# Patient Record
Sex: FEMALE
[De-identification: names, ages, dates, MRNs, and addresses within clinical notes are randomized per-mention and may not be internally consistent; named-entity substitution may affect disease eponyms.]

---

## 2022-05-06 ENCOUNTER — DASHBOARD ENCOUNTERS (OUTPATIENT)
Age: 47
End: 2022-05-06

## 2022-05-06 ENCOUNTER — OFFICE VISIT (OUTPATIENT)
Dept: URBAN - METROPOLITAN AREA CLINIC 80 | Facility: CLINIC | Age: 47
End: 2022-05-06
Payer: COMMERCIAL

## 2022-05-06 ENCOUNTER — WEB ENCOUNTER (OUTPATIENT)
Dept: URBAN - METROPOLITAN AREA CLINIC 80 | Facility: CLINIC | Age: 47
End: 2022-05-06

## 2022-05-06 DIAGNOSIS — D64.89 OTHER SPECIFIED ANEMIAS: ICD-10-CM

## 2022-05-06 DIAGNOSIS — R10.12 LEFT UPPER QUADRANT ABDOMINAL PAIN: ICD-10-CM

## 2022-05-06 DIAGNOSIS — K80.20 CALCULUS OF GALLBLADDER WITHOUT CHOLECYSTITIS WITHOUT OBSTRUCTION: ICD-10-CM

## 2022-05-06 DIAGNOSIS — K76.0 FATTY LIVER: ICD-10-CM

## 2022-05-06 PROBLEM — 70342003: Status: ACTIVE | Noted: 2022-05-06

## 2022-05-06 PROBLEM — 197321007: Status: ACTIVE | Noted: 2022-05-06

## 2022-05-06 PROCEDURE — 99203 OFFICE O/P NEW LOW 30 MIN: CPT | Performed by: INTERNAL MEDICINE

## 2022-05-06 NOTE — HPI-TODAY'S VISIT:
Lower back and Left upper abd pain- Pain for about a year, really can't tell what brings it on.  Epigastric pain w eating.

## 2022-05-24 ENCOUNTER — CLAIMS CREATED FROM THE CLAIM WINDOW (OUTPATIENT)
Dept: URBAN - METROPOLITAN AREA CLINIC 4 | Facility: CLINIC | Age: 47
End: 2022-05-24
Payer: COMMERCIAL

## 2022-05-24 ENCOUNTER — OFFICE VISIT (OUTPATIENT)
Dept: URBAN - METROPOLITAN AREA SURGERY CENTER 19 | Facility: SURGERY CENTER | Age: 47
End: 2022-05-24

## 2022-05-24 ENCOUNTER — CLAIMS CREATED FROM THE CLAIM WINDOW (OUTPATIENT)
Dept: URBAN - METROPOLITAN AREA SURGERY CENTER 19 | Facility: SURGERY CENTER | Age: 47
End: 2022-05-24
Payer: COMMERCIAL

## 2022-05-24 DIAGNOSIS — K21.00 GASTRO-ESOPHAGEAL REFLUX DISEASE WITH ESOPHAGITIS, WITHOUT BLEEDING: ICD-10-CM

## 2022-05-24 DIAGNOSIS — Z12.11 COLON CANCER SCREENING: ICD-10-CM

## 2022-05-24 DIAGNOSIS — K29.60 ADENOPAPILLOMATOSIS GASTRICA: ICD-10-CM

## 2022-05-24 DIAGNOSIS — B96.81 HELICOBACTER PYLORI [H. PYLORI] AS THE CAUSE OF DISEASES CLASSIFIED ELSEWHERE: ICD-10-CM

## 2022-05-24 DIAGNOSIS — K29.60 OTHER GASTRITIS WITHOUT BLEEDING: ICD-10-CM

## 2022-05-24 DIAGNOSIS — K21.9 ACID REFLUX: ICD-10-CM

## 2022-05-24 DIAGNOSIS — K31.89 FOCAL FOVEOLAR HYPERPLASIA: ICD-10-CM

## 2022-05-24 DIAGNOSIS — K22.10 EROSIVE ESOPHAGITIS: ICD-10-CM

## 2022-05-24 DIAGNOSIS — B96.81 BACTERIAL INFECTION DUE TO H. PYLORI: ICD-10-CM

## 2022-05-24 DIAGNOSIS — R10.12 LEFT UPPER QUADRANT ABDOMINAL PAIN: ICD-10-CM

## 2022-05-24 PROCEDURE — 43239 EGD BIOPSY SINGLE/MULTIPLE: CPT | Performed by: INTERNAL MEDICINE

## 2022-05-24 PROCEDURE — G8907 PT DOC NO EVENTS ON DISCHARG: HCPCS | Performed by: INTERNAL MEDICINE

## 2022-05-24 PROCEDURE — 88312 SPECIAL STAINS GROUP 1: CPT | Performed by: PATHOLOGY

## 2022-05-24 PROCEDURE — 88342 IMHCHEM/IMCYTCHM 1ST ANTB: CPT | Performed by: PATHOLOGY

## 2022-05-24 PROCEDURE — 88305 TISSUE EXAM BY PATHOLOGIST: CPT | Performed by: PATHOLOGY

## 2022-05-24 PROCEDURE — 45378 DIAGNOSTIC COLONOSCOPY: CPT | Performed by: INTERNAL MEDICINE

## 2022-05-30 ENCOUNTER — TELEPHONE ENCOUNTER (OUTPATIENT)
Dept: URBAN - METROPOLITAN AREA CLINIC 92 | Facility: CLINIC | Age: 47
End: 2022-05-30

## 2022-05-31 ENCOUNTER — TELEPHONE ENCOUNTER (OUTPATIENT)
Dept: URBAN - METROPOLITAN AREA CLINIC 92 | Facility: CLINIC | Age: 47
End: 2022-05-31

## 2022-05-31 PROBLEM — 87522002: Status: ACTIVE | Noted: 2022-05-30

## 2022-06-07 ENCOUNTER — TELEPHONE ENCOUNTER (OUTPATIENT)
Dept: URBAN - METROPOLITAN AREA CLINIC 80 | Facility: CLINIC | Age: 47
End: 2022-06-07

## 2022-06-07 RX ORDER — METRONIDAZOLE 500 MG/1
1 TABLET TABLET ORAL TWICE A DAY
Qty: 28 TABLET | Refills: 0 | OUTPATIENT
Start: 2022-06-07 | End: 2022-06-21

## 2022-06-07 RX ORDER — CLARITHROMYCIN 500 MG/1
2 TABLETS TABLET, FILM COATED ORAL
Qty: 56 TABLET | Refills: 0 | OUTPATIENT
Start: 2022-06-07 | End: 2022-06-21

## 2022-06-07 RX ORDER — OMEPRAZOLE 40 MG/1
1 CAPSULE 30 MINUTES BEFORE MORNING MEAL CAPSULE, DELAYED RELEASE ORAL BID
Qty: 28 | Refills: 0 | OUTPATIENT
Start: 2022-06-07

## 2022-07-07 ENCOUNTER — OFFICE VISIT (OUTPATIENT)
Dept: URBAN - METROPOLITAN AREA CLINIC 79 | Facility: CLINIC | Age: 47
End: 2022-07-07

## 2022-12-27 NOTE — PHYSICAL EXAM EYES:
Conjuntivae and eyelids appear normal, Sclerae : White without injection Inpatient Cardiology Progress note     PATIENT IS BEING FOLLOWED FOR: NSTEMI    Eddy Colon is a 75 y.o. female known to Dr. Darci Mcdonald     Patient is 75years old female with history of hypertension and hyperlipidemia who presented to the hospital 12/24/22 with chest pain and shortness of breath, patient was found to have elevated troponin, cardiology was consulted. As per patient patient has been having dyspnea on exertion and exertional chest pain since June, better with rest, symptoms have gotten worse over the last few weeks. 12/23/22,  chest pain was significantly worse, that prompted her to seek medical attention. At the time of the consultation she was chest pain-free: did complain of short of breath described as having to take a deep breath to expand her lungs. Denied any pedal edema, + easy fatigability, no PND, no orthopnea, denied lightheadedness, palpitations, presyncope or syncope. N.B. Patient was scheduled for LHC by her primary cardiologist Dr. Darci Mcdonald 1/3/23    SUBJECTIVE: Denies CP. Denies SOB \" but I'm not doing any activity \"  OBJECTIVE: No apparent distress     ROS:  Consist: Denies fevers, chills or night sweats  Heart: Denies chest pain, palpitations, lightheadedness, dizziness or syncope  Lungs: Denies SOB, cough, wheezing, orthopnea or PND  GI: Denies abdominal pain, vomiting or diarrhea    PHYSICAL EXAM:   /71   Pulse 70   Temp 99.4 °F (37.4 °C) (Temporal)   Resp 18   Ht 5' 4\" (1.626 m)   Wt 149 lb 2 oz (67.6 kg)   SpO2 96%   BMI 25.60 kg/m²    B/P Range last 24 hours: Systolic (74CAK), CRM:806 , Min:119 , VZU:396    Diastolic (73XGY), KMM:40, Min:67, Max:75    CONST: Well developed, well nourished female who appears of stated age. Awake, alert and cooperative. No apparent distress  HEENT:   Head- Normocephalic, atraumatic   Eyes- Conjunctivae pink, anicteric  Throat- Oral mucosa pink and moist  Neck-  No stridor, trachea midline, no jugular venous distention.  No carotid bruit  CHEST: Chest symmetrical and non-tender to palpation. No accessory muscle use or intercostal retractions  RESPIRATORY:  Lung sounds - clear throughout fields   CARDIOVASCULAR:     Heart Inspection- shows no noted pulsations  Heart Palpation- no heaves or thrills; PMI is non-displaced   Heart Ausculation- Regular rate and rhythm, no murmur. No s3, s4 or rub   PV: No lower extremity edema. No varicosities. Pedal pulses palpable, no clubbing or cyanosis   ABDOMEN: Soft, non-tender to light palpation. Bowel sounds present. No palpable masses no organomegaly; no abdominal bruit  MS: Good muscle strength and tone. No atrophy or abnormal movements. : Deferred  SKIN: Warm and dry no statis dermatitis or ulcers   NEURO / PSYCH: Oriented to person, place and time. Speech clear and appropriate. Follows all commands.  Pleasant affect       Intake/Output Summary (Last 24 hours) at 12/27/2022 1520  Last data filed at 12/27/2022 1512  Gross per 24 hour   Intake 455.96 ml   Output 400 ml   Net 55.96 ml       Weight:   Wt Readings from Last 3 Encounters:   12/27/22 149 lb 2 oz (67.6 kg)   12/14/22 155 lb (70.3 kg)   06/14/22 162 lb (73.5 kg)     Current Inpatient Medications:   lactulose  20 g Oral BID    pantoprazole  40 mg Oral QAM AC    metoprolol succinate  50 mg Oral BID    amLODIPine  10 mg Oral Daily    zolpidem  10 mg Oral Nightly    sodium chloride flush  5-40 mL IntraVENous 2 times per day    aspirin  81 mg Oral Daily    simvastatin  40 mg Oral Nightly       IV Infusions (if any):   nitroGLYCERIN 10 mcg/min (12/27/22 1500)    heparin (PORCINE) Infusion 12 Units/kg/hr (12/27/22 1500)    sodium chloride         DIAGNOSTIC/ LABORATORY DATA:  Labs:   CBC:   Recent Labs     12/26/22  0529 12/27/22  0452   WBC 7.5 7.3   HGB 9.4* 10.3*   HCT 28.7* 31.1*    285     BMP:   Recent Labs     12/25/22  0513 12/27/22  0452    143   K 4.2 3.9   CO2 21* 26   BUN 21 12   CREATININE 1.0 0.7   LABGLOM 59 >60 CALCIUM 10.0 10.0         APTT:  Recent Labs     12/26/22  0529 12/27/22  0452   APTT 50.3* 52.9*     CARDIAC ENZYMES:  Recent Labs     12/24/22  1814 12/25/22  0513   TROPHS 506* 979*     FASTING LIPID PANEL:  Lab Results   Component Value Date/Time    CHOL 147 12/25/2022 05:13 AM    HDL 63 12/25/2022 05:13 AM    LDLCALC 61 12/25/2022 05:13 AM    TRIG 113 12/25/2022 05:13 AM           CXR 1/24/22:   Cardiomegaly with  patchy perihilar and bibasilar infiltrates and effusions likely CHF/edema and or pneumonia. Soft tissue prominence in the right hilum. Consider surveillance preferably by CT scan. 12 lead EKG 12/24/22:  Sinus tachycardia  Possible Left atrial enlargement  Cannot rule out Inferior infarct , age undetermined  ST & T wave abnormality, consider anterolateral ischemia    Telemetry: SR in the 80's    Echo 12/26/22 ( Dr. Ryanne Medina ):   Summary   The left ventricle is mildly dilated. There is apical wall akinesis, distal septal dyskinesis and distal   inferior wall hypokinesis with thinning corresponding thinning of the   myocardium. Ejection fraction is visually estimated at 40%. There is doppler evidence of stage I diastolic dysfunction. Normal right ventricular size and function. Normal size atria. Mild mitral regurgitation. Mild tricuspid regurgitation. Moderate pulmonary hypertension. ASSESSMENT:   1.  Non-ST elevation MI, remains CP free  2. Ischemic cardiomyopathy with moderate LV systolic dysfunction ( EF ~ 40% on Echo 12/26/22 )  3. Acute HFrEF  4. Mild MR, Mild TR and moderate pulmonary HTN on Echo 12/26/22  5. Hypertension: Controlled   6. Hyperlipidemia: Simvastatin    7.   Anemia with significant drop in H&H since admission but stable over the past 3 days         PLAN:  Will start PO lasix, spironolactone and ACE I therapy after cath  Rest of Cardiac medications same for now  Cath deferred for tomorrow ( if no evidence of active bleeding )  For EGD today per General surgery --> discussed with General surgery   All above explained to patient and all questions answered  Will continue to follow closely    Electronically signed by Sea Abdi MD on 12/27/2022 at 3:20 PM